# Patient Record
Sex: MALE | Race: WHITE | NOT HISPANIC OR LATINO | URBAN - METROPOLITAN AREA
[De-identification: names, ages, dates, MRNs, and addresses within clinical notes are randomized per-mention and may not be internally consistent; named-entity substitution may affect disease eponyms.]

---

## 2017-01-18 ENCOUNTER — APPOINTMENT (RX ONLY)
Dept: URBAN - METROPOLITAN AREA CLINIC 37 | Facility: CLINIC | Age: 67
Setting detail: DERMATOLOGY
End: 2017-01-18

## 2017-01-18 ENCOUNTER — APPOINTMENT (RX ONLY)
Dept: URBAN - METROPOLITAN AREA CLINIC 38 | Facility: CLINIC | Age: 67
Setting detail: DERMATOLOGY
End: 2017-01-18

## 2017-01-18 DIAGNOSIS — D485 NEOPLASM OF UNCERTAIN BEHAVIOR OF SKIN: ICD-10-CM

## 2017-01-18 PROBLEM — D48.5 NEOPLASM OF UNCERTAIN BEHAVIOR OF SKIN: Status: ACTIVE | Noted: 2017-01-18

## 2017-01-18 PROCEDURE — 99212 OFFICE O/P EST SF 10 MIN: CPT

## 2017-01-18 PROCEDURE — ? DEFER

## 2017-01-18 ASSESSMENT — LOCATION SIMPLE DESCRIPTION DERM
LOCATION SIMPLE: RIGHT FOREARM
LOCATION SIMPLE: RIGHT FOREARM

## 2017-01-18 ASSESSMENT — LOCATION ZONE DERM
LOCATION ZONE: ARM
LOCATION ZONE: ARM

## 2017-01-18 ASSESSMENT — LOCATION DETAILED DESCRIPTION DERM
LOCATION DETAILED: RIGHT DISTAL DORSAL FOREARM
LOCATION DETAILED: RIGHT DISTAL DORSAL FOREARM

## 2017-01-18 NOTE — PROCEDURE: DEFER
Detail Level: Detailed
Instructions (Optional): Patient came in questioning need for procedure. I reviewed the biopsy in detail, and explained that the pathologist was unable to make a single definitive diagnosis, and instead listed three possibilities: 1) a benign cyst with a \"warty\" lining 2) a benign seborrheic keratosis or 3) a squamous cell skin cancer. I explained that because I cannot be sure of whether this is benign, my advice is to have the area surgically excised, both to ensure total removal and also to give a specimen that can be reanalyzed and likely firm up the diagnosis. If surgery was totally unacceptable to the patient, my alternative, less desirable option would be ED&C. Explained that this would not give a specimen that could be evaluated for margins, and had a higher likelihood of leaving \"roots\" behind, which if cancerous could lead to a recurrence, but still gives a much greater degree of confidence. He asked questions and despite my advice, decided to refuse any procedure, saying he would monitor the area himself. I noted that this runs the risk that the deeper \"roots\" of the lesion, possibly cancerous, would grow for an extended period of time under the skin surface, and by the time it finally surfaced, could be substantially larger and more difficult to remove. I stepped out to give him and his wife time to discuss and when I returned he had walked out, refusing surgery.

## 2017-01-18 NOTE — PROCEDURE: DEFER
Instructions (Optional): Patient came in questioning need for procedure. I reviewed the biopsy in detail, and explained that the pathologist was unable to make a single definitive diagnosis, and instead listed three possibilities: 1) a benign cyst with a \"warty\" lining 2) a benign seborrheic keratosis or 3) a squamous cell skin cancer. I explained that because I cannot be sure of whether this is benign, my advice is to have the area surgically excised, both to ensure total removal and also to give a specimen that can be reanalyzed and likely firm up the diagnosis. If surgery was totally unacceptable to the patient, my alternative, less desirable option would be ED&C. Explained that this would not give a specimen that could be evaluated for margins, and had a higher likelihood of leaving \"roots\" behind, which if cancerous could lead to a recurrence, but still gives a much greater degree of confidence. He asked questions and despite my advice, decided to refuse any procedure, saying he would monitor the area himself. I noted that this runs the risk that the deeper \"roots\" of the lesion, possibly cancerous, would grow for an extended period of time under the skin surface, and by the time it finally surfaced, could be substantially larger and more difficult to remove. I stepped out to give him and his wife time to discuss and when I returned he had walked out, refusing surgery.
Detail Level: Detailed

## 2017-04-11 ENCOUNTER — APPOINTMENT (RX ONLY)
Dept: URBAN - METROPOLITAN AREA CLINIC 45 | Facility: CLINIC | Age: 67
Setting detail: DERMATOLOGY
End: 2017-04-11

## 2017-04-11 ENCOUNTER — APPOINTMENT (RX ONLY)
Dept: URBAN - METROPOLITAN AREA CLINIC 37 | Facility: CLINIC | Age: 67
Setting detail: DERMATOLOGY
End: 2017-04-11

## 2017-04-11 ENCOUNTER — APPOINTMENT (RX ONLY)
Dept: URBAN - METROPOLITAN AREA CLINIC 38 | Facility: CLINIC | Age: 67
Setting detail: DERMATOLOGY
End: 2017-04-11

## 2017-04-11 ENCOUNTER — APPOINTMENT (RX ONLY)
Dept: URBAN - METROPOLITAN AREA CLINIC 44 | Facility: CLINIC | Age: 67
Setting detail: DERMATOLOGY
End: 2017-04-11

## 2017-04-11 DIAGNOSIS — L90.5 SCAR CONDITIONS AND FIBROSIS OF SKIN: ICD-10-CM

## 2017-04-11 PROCEDURE — ? COUNSELING

## 2017-04-11 PROCEDURE — 99212 OFFICE O/P EST SF 10 MIN: CPT

## 2017-04-11 ASSESSMENT — LOCATION DETAILED DESCRIPTION DERM
LOCATION DETAILED: RIGHT DISTAL DORSAL FOREARM

## 2017-04-11 ASSESSMENT — LOCATION ZONE DERM
LOCATION ZONE: ARM

## 2017-04-11 ASSESSMENT — LOCATION SIMPLE DESCRIPTION DERM
LOCATION SIMPLE: RIGHT FOREARM

## 2017-04-11 NOTE — PROCEDURE: MIPS QUALITY
Detail Level: Detailed
Quality 431: Preventive Care And Screening: Unhealthy Alcohol Use - Screening: Patient screened for unhealthy alcohol use using a single question and scores less than 2 times per year
Quality 47: Advance Care Plan: Advance Care Planning discussed and documented in the medical record; patient did not wish or was not able to name a surrogate decision maker or provide an advance care plan.
Quality 226: Preventive Care And Screening: Tobacco Use: Screening And Cessation Intervention: Patient screened for tobacco and never smoked
Quality 130: Documentation Of Current Medications In The Medical Record: Current Medications Documented
Quality 110: Preventive Care And Screening: Influenza Immunization: Influenza Immunization Administered during Influenza season
Quality 111:Pneumonia Vaccination Status For Older Adults: Pneumococcal Vaccination Previously Received

## 2017-04-11 NOTE — PROCEDURE: MIPS QUALITY
Quality 47: Advance Care Plan: Advance Care Planning discussed and documented in the medical record; patient did not wish or was not able to name a surrogate decision maker or provide an advance care plan.
Quality 111:Pneumonia Vaccination Status For Older Adults: Pneumococcal Vaccination Previously Received
Quality 431: Preventive Care And Screening: Unhealthy Alcohol Use - Screening: Patient screened for unhealthy alcohol use using a single question and scores less than 2 times per year
Quality 130: Documentation Of Current Medications In The Medical Record: Current Medications Documented
Quality 110: Preventive Care And Screening: Influenza Immunization: Influenza Immunization Administered during Influenza season
Quality 226: Preventive Care And Screening: Tobacco Use: Screening And Cessation Intervention: Patient screened for tobacco and never smoked
Detail Level: Detailed